# Patient Record
Sex: FEMALE | Race: WHITE | NOT HISPANIC OR LATINO
[De-identification: names, ages, dates, MRNs, and addresses within clinical notes are randomized per-mention and may not be internally consistent; named-entity substitution may affect disease eponyms.]

---

## 2023-08-04 ENCOUNTER — DASHBOARD ENCOUNTERS (OUTPATIENT)
Age: 58
End: 2023-08-04

## 2023-08-04 ENCOUNTER — LAB OUTSIDE AN ENCOUNTER (OUTPATIENT)
Dept: URBAN - METROPOLITAN AREA CLINIC 88 | Facility: CLINIC | Age: 58
End: 2023-08-04

## 2023-08-04 ENCOUNTER — WEB ENCOUNTER (OUTPATIENT)
Dept: URBAN - METROPOLITAN AREA CLINIC 88 | Facility: CLINIC | Age: 58
End: 2023-08-04

## 2023-08-04 ENCOUNTER — OFFICE VISIT (OUTPATIENT)
Dept: URBAN - METROPOLITAN AREA CLINIC 88 | Facility: CLINIC | Age: 58
End: 2023-08-04
Payer: COMMERCIAL

## 2023-08-04 VITALS
SYSTOLIC BLOOD PRESSURE: 158 MMHG | HEIGHT: 63 IN | HEART RATE: 105 BPM | TEMPERATURE: 97.9 F | WEIGHT: 236.8 LBS | BODY MASS INDEX: 41.96 KG/M2 | DIASTOLIC BLOOD PRESSURE: 78 MMHG

## 2023-08-04 DIAGNOSIS — R15.2 FECAL URGENCY: ICD-10-CM

## 2023-08-04 DIAGNOSIS — R19.7 DIARRHEA OF PRESUMED INFECTIOUS ORIGIN: ICD-10-CM

## 2023-08-04 DIAGNOSIS — R14.0 ABDOMINAL BLOATING: ICD-10-CM

## 2023-08-04 DIAGNOSIS — R15.9 FULL INCONTINENCE OF FECES: ICD-10-CM

## 2023-08-04 PROBLEM — 142251000119102: Status: ACTIVE | Noted: 2023-08-04

## 2023-08-04 PROCEDURE — 99204 OFFICE O/P NEW MOD 45 MIN: CPT | Performed by: NURSE PRACTITIONER

## 2023-08-04 RX ORDER — LAMOTRIGINE 100 MG/1
1 TABLET TABLET ORAL ONCE A DAY
Status: ACTIVE | COMMUNITY

## 2023-08-04 RX ORDER — DIAZEPAM 5 MG/1
1 TABLET AS NEEDED TABLET ORAL ONCE A DAY
Status: ACTIVE | COMMUNITY

## 2023-08-04 RX ORDER — PRAZIQUANTEL 600 MG/1
TAKE 2 TABLETS TABLET, FILM COATED ORAL
Qty: 4 | Refills: 0 | OUTPATIENT
Start: 2023-08-07 | End: 2023-08-09
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
## 2023-08-16 LAB
A/G RATIO: 1.6
ABSOLUTE BASOPHILS: 64
ABSOLUTE EOSINOPHILS: 182
ABSOLUTE LYMPHOCYTES: 2054
ABSOLUTE MONOCYTES: 567
ABSOLUTE NEUTROPHILS: 7832
ALBUMIN: 4.7
ALKALINE PHOSPHATASE: 110
ALT (SGPT): 30
AST (SGOT): 19
BASOPHILS: 0.6
BILIRUBIN, TOTAL: 0.5
BUN/CREATININE RATIO: (no result)
BUN: 22
C-REACTIVE PROTEIN, QUANT: 4.6
CALCIUM: 10.7
CARBON DIOXIDE, TOTAL: 25
CHLORIDE: 102
CREATININE: 0.74
EGFR: 93
EOSINOPHILS: 1.7
GLOBULIN, TOTAL: 3
GLUCOSE: 108
HEMATOCRIT: 44.5
HEMOGLOBIN: 15.1
IMMUNOGLOBULIN A, QN, SERUM: 379
INTERPRETATION: (no result)
LYMPHOCYTES: 19.2
MCH: 29.6
MCHC: 33.9
MCV: 87.3
MONOCYTES: 5.3
MPV: 9.1
NEUTROPHILS: 73.2
PLATELET COUNT: 321
POTASSIUM: 4.2
PROTEIN, TOTAL: 7.7
RDW: 12.6
RED BLOOD CELL COUNT: 5.1
SED RATE BY MODIFIED: 2
SODIUM: 138
T-TRANSGLUTAMINASE (TTG) IGA: <1
WHITE BLOOD CELL COUNT: 10.7

## 2023-08-23 ENCOUNTER — TELEPHONE ENCOUNTER (OUTPATIENT)
Dept: URBAN - METROPOLITAN AREA CLINIC 70 | Facility: CLINIC | Age: 58
End: 2023-08-23

## 2023-08-23 ENCOUNTER — LAB OUTSIDE AN ENCOUNTER (OUTPATIENT)
Dept: URBAN - METROPOLITAN AREA CLINIC 70 | Facility: CLINIC | Age: 58
End: 2023-08-23

## 2023-08-23 LAB
CALPROTECTIN, FECAL: 6
CAMPYLOBACTER SPP. AG,EIA: (no result)
CLOSTRIDIUM DIFFICILE: (no result)
LEUKOCYTES: (no result)
OVA AND PARASITES, CONC AND PERM SMEAR: (no result)
PANCREATIC ELASTASE, FECAL: >500
SALMONELLA AND SHIGELLA, CULTURE: (no result)
SHIGA TOXINS, EIA W/RFL TO E.COLI O157 CULTURE: (no result)